# Patient Record
Sex: FEMALE | Race: WHITE | Employment: OTHER | URBAN - METROPOLITAN AREA
[De-identification: names, ages, dates, MRNs, and addresses within clinical notes are randomized per-mention and may not be internally consistent; named-entity substitution may affect disease eponyms.]

---

## 2022-04-07 ENCOUNTER — HOSPITAL ENCOUNTER (OUTPATIENT)
Dept: PHYSICAL THERAPY | Age: 77
Discharge: HOME | End: 2022-04-07
Payer: MEDICARE

## 2022-04-07 PROCEDURE — 97110 THERAPEUTIC EXERCISES: CPT

## 2022-04-07 PROCEDURE — 97161 PT EVAL LOW COMPLEX 20 MIN: CPT

## 2022-04-07 NOTE — PROGRESS NOTES
PHYSICAL THERAPY - DAILY TREATMENT NOTE     Patient Name: Dulce Maria Nguyen        Date: 2022  : 1945   YES Patient  Verified  Visit #:     Insurance: Payor: Rosemary Lilly / Plan: [place] MEDICARE PART A & B / Product Type: Medicare /      In time: 135 Out time: 220   Total Treatment Time: 45     Medicare/BCBS Time Tracking (below)   Total Timed Codes (min):  15 1:1 Treatment Time:  15     TREATMENT AREA =  Pain in left hip [M25.552]    SUBJECTIVE    Pain Level (on 0 to 10 scale):  1-2  / 10   Medication Changes/New allergies or changes in medical history, any new surgeries or procedures?     NO    If yes, update Summary List   Subjective Functional Status/Changes:  []  No changes reported     See eval /POC         OBJECTIVE  Modalities Rationale:     decrease pain to improve patient's ability to return to PLOF      min [] Estim, type/location:                                      []  att     []  unatt     []  w/US     []  w/ice    []  w/heat    min []  Mechanical Traction: type/lbs                   []  pro   []  sup   []  int   []  cont    []  before manual    []  after manual    min []  Ultrasound, settings/location:      min []  Iontophoresis w/ dexamethasone, location:                                               []  take home patch       []  in clinic    min []  Ice     []  Heat    location/position:     min []  Vasopneumatic Device, press/temp:     min []  Other:    [] Skin assessment post-treatment (if applicable):    []  intact    []  redness- no adverse reaction     []redness  adverse reaction:      15 min Therapeutic Exercise:  [x]  See flow sheet   Rationale:      increase ROM and increase strength to improve the patients ability to return to PLOF      min Manual Therapy: Technique:      [] S/DTM []IASTM []PROM [] Passive Stretching   []manual TPR    []Jt manipulation:Gr I [] II []  III [] IV[]  []REIL with manual OP  Treatment Area:     Rationale:      decrease pain, increase ROM, increase tissue extensibility and decrease trigger points to improve patient's ability to return to PLOF     min Neuromuscular Re-ed: [x]  See flow sheet   Rationale:      improve coordination, improve balance, increase proprioception and dec dizziness to improve the patients ability to return to PLOF        min Self Care:    Rationale:    increase ROM, increase strength and improve coordination to improve the patients ability to return to PLOF    Billed With/As:   [] TE   [] TA   [] Neuro   [] Self Care Patient Education: [x] Review HEP    [] Progressed/Changed HEP based on:   [] positioning   [] body mechanics   [] transfers   [] heat/ice application    [] other:        Other Objective/Functional Measures:    See eval/ POC     Post Treatment Pain Level (on 0 to 10) scale:   1-2  / 10     ASSESSMENT    X  See POC     PLAN    [x]  Upgrade activities as tolerated {YES) Continue plan of care   []  Discharge due to :    []  Other:      Therapist: Maria Elena Conde PT    Date: 4/7/2022 Time: 2:23 PM   No future appointments.

## 2022-04-07 NOTE — PROGRESS NOTES
[facility] PHYSICAL THERAPY AT [address] [facility] Sarah Ziegler, [address] - Phone: [phone]  Fax: [phone] / [facility]  Patient Name: Deandre Thornton : 1945   Medical   Diagnosis: Pain in left hip [M25.552] Treatment Diagnosis: L femoral head Fx; s/p hemiarthroplasty   Onset Date: 2/10/22 (injury)  22 (surgery)     [facility]   Prior Hospitalization: See medical history Provider #: [id number]   Prior Level of Function: Able to walk without pain   Comorbidities: Undergoing chemotherapy for CA, osteoporosis, arthritis, essential tremor, Hx DVT   Medications: Verified on Patient Summary List   The Plan of Care and following information is based on the information from the initial evaluation.   ================================================================  Assessment / key information: Deandre Thornton is a 68 y.o.  yo female with Dx of Pain in left hip [M25.552]. She reports fracturing her L hip when she fell while trying to step up onto a curb. She was seen later that day by her PCP; then at a local ER. She underwent surgery the next day. She remained in the hospital for several days. Upon release to home, she participated in a 6 week course of home health PT. She now presents for ongoing rehabilitation at this facility. Observation: well healing surgical incision. Essential tremor present L>R LE  Gait: ambulates into clinic using SC. She reports no AD use at home. Zainab is slow. Decreased step length. Mild out-toeing R>L  ROM measures: flex .  90, ext to neutral, ER= 28 degrees, abd= 15 degrees  MMT measures: WFL except diminished abd  Special tests: na  FOTO score= 41%.  ================================================================  Eval Complexity: History MEDIUM  Complexity : 1-2 comorbidities / personal factors will impact the outcome/ POC ;  Examination  HIGH Complexity : 4+ Standardized tests and measures addressing body structure, function, activity limitation and / or participation in recreation ; Presentation LOW Complexity : Stable, uncomplicated ;  Decision Making MEDIUM Complexity : FOTO score of 26-74; Overall Complexity LOW   Problem List: pain affecting function, decrease ROM, decrease strength, impaired gait/ balance, decrease ADL/ functional abilitiies, decrease activity tolerance and decrease flexibility/ joint mobility   Treatment Plan may include any combination of the following: Therapeutic exercise, Therapeutic activities, Neuromuscular re-education, Physical agent/modality, Gait/balance training, Manual therapy and Patient education  Patient / Family readiness to learn indicated by: asking questions, trying to perform skills and interest  Persons(s) to be included in education: patient (P) and family support person (FSP);list   Barriers to Learning/Limitations: None  Measures taken, if barriers to learning:    Patient Goal (s): Better ROM, flexibility, balance, strength, decrease pain and spasm   Patient self reported health status: good  Rehabilitation Potential: good     Short Term Goals: To be accomplished in  2  weeks:  1 Patient will report >= 25% improvement in symptoms with ADLs  2 Patient will be educated in appropriate ROM, stabilization, strengthening exercises; patient to report/ demonstrate compliance.  Long Term Goals: To be accomplished in  4-8  weeks:  1 Patient to report >= 75% improvement in symptoms with ADLs. 2 Patient will be independent with finalized HEP/ self maintenance. 3 Increase FOTO score >= 63% to indicate improved function with use of L LE.  4 Patient to ambulate ad leodan in community without AD and minimal deviation.     Frequency / Duration:   Patient to be seen  2  times per week for 4-8  weeks:  Patient / Caregiver education and instruction: self care, activity modification and exercises    Therapist Signature: Homero Acevedo PT Date: 3/6/8506   Certification Period: 4/7/22 to 7/4/22 Time: 2:24 PM   ===================================================================  I certify that the above Physical Therapy Services are being furnished while the patient is under my care. I agree with the treatment plan and certify that this therapy is necessary. Physician Signature:        Date:       Time:        Brittani Jacinto  Please sign and return to [facility] or you may fax the signed copy to [phone]. Thank you.

## 2022-04-11 ENCOUNTER — HOSPITAL ENCOUNTER (OUTPATIENT)
Dept: PHYSICAL THERAPY | Age: 77
Discharge: HOME | End: 2022-04-11
Payer: MEDICARE

## 2022-04-11 PROCEDURE — 97530 THERAPEUTIC ACTIVITIES: CPT

## 2022-04-11 PROCEDURE — 97140 MANUAL THERAPY 1/> REGIONS: CPT

## 2022-04-11 PROCEDURE — 97110 THERAPEUTIC EXERCISES: CPT

## 2022-04-11 NOTE — PROGRESS NOTES
Order printed. On your desk for signature for TC to fax.   Dano Perez RN     PHYSICAL THERAPY - DAILY TREATMENT NOTE     Patient Name: Dusitn Loge        Date: 2022  : 1945   YES Patient  Verified  Visit #:   2     Insurance: Payor: Sutton  / Plan: [place] MEDICARE PART A & B / Product Type: Medicare /      In time: 052 Out time: 855   Total Treatment Time: 60     Medicare/BCBS Time Tracking (below)   Total Timed Codes (min):  50 1:1 Treatment Time:  50     TREATMENT AREA =  Pain in left hip [M25.552]    SUBJECTIVE    Pain Level (on 0 to 10 scale):    / 10   Medication Changes/New allergies or changes in medical history, any new surgeries or procedures? NO    If yes, update Summary List   Subjective Functional Status/Changes:  []  No changes reported     Just kind of feel it.   Doing new exercises         OBJECTIVE  Modalities Rationale:     decrease inflammation and decrease pain to improve patient's ability to perform ADLs      min [] Estim, type/location:                                      []  att     []  unatt     []  w/US     []  w/ice    []  w/heat    min []  Mechanical Traction: type/lbs                   []  pro   []  sup   []  int   []  cont    []  before manual    []  after manual    min []  Ultrasound, settings/location:      min []  Iontophoresis w/ dexamethasone, location:                                               []  take home patch       []  in clinic   10 min [x]  Ice     []  Heat    location/position:     min []  Vasopneumatic Device, press/temp:              cold / med If using vaso       pre-treatment girth :       post-treatment girth :       measured at (location) :     min []  Other:    [x] Skin assessment post-treatment (if applicable):    [x]  intact    []  redness- no adverse reaction     []redness  adverse reaction:      10 min Manual Therapy: Technique:      [x] S/DTM []IASTM []PROM [] Passive Stretching   [x]manual TPR  [] SOR [] man traction  []Jt manipulation:Gr I [] II []  III [] IV[]   [] OP with REIL    []   Treatment Area: L hip region      *manual therapy interventions were performed at a separate and distinct time from   the therapeutic activities interventions. Rationale:      decrease pain, increase ROM, increase tissue extensibility and decrease trigger points to improve patient's ability to perform ADLs    30 min Therapeutic Exercise:  [x]  See flow sheet   Rationale:      increase ROM and increase strength to improve the patients ability to perform ADLs       10 min Therapeutic Activity: [x]  See flow sheet   Rationale:      increase strength, improve coordination and improve balance to improve the patients ability to perform ADLs       Billed With/As:   [x] TE   [x] TA   [] Neuro   [] Self Care Patient Education: [x] Review HEP    [] Progressed/Changed HEP based on:   [] positioning   [] body mechanics   [] transfers   [] heat/ice application    [] other:        Other Objective/Functional Measures:    Progressed there ex/ there act--adding bike for endurance, hip abd/ clam, hip flex/ abd, step up, squat    Pt with diminished balance requiring UE support for standing ex    Dec step length   Post Treatment Pain Level (on 0 to 10) scale:   1  / 10     ASSESSMENT    Assessment/Changes in Function:     Went to [email or web address] this weekend for the first time since breaking my hip     []  See Progress Note/Recertification   Patient will continue to benefit from skilled PT services to modify and progress therapeutic interventions, address functional mobility deficits, address ROM deficits, address strength deficits, analyze and address soft tissue restrictions, analyze and cue movement patterns, address imbalance/dizziness and instruct in home and community integration to attain remaining goals.       Progress toward goals / Updated goals:    Good Progress to    [x] STG    [x] LTG  2 as shown by pt report/ demo       []  See Progress Note/Recertification    PLAN    [x]  Upgrade activities as tolerated {YES) Continue plan of care   [] Discharge due to :    []  Other:      Therapist: Catina Westfall PT    Date: 4/11/2022 Time: 7:55 AM     Future Appointments   Date Time Provider Burton Escamilla   4/11/2022  8:00 AM Bebe Mebane, PT [facility]   4/15/2022 11:15 AM Bebe Mebane, PT [facility]   4/18/2022 12:00 PM Davion Alvarenga, PT [facility]   4/22/2022 12:00 PM Bebe Mebane, PT [facility]   4/25/2022  9:45 AM Ebb Altamont, PTA [facility]   4/29/2022 11:15 AM Bebe Mebane, PT [facility]   5/2/2022 11:00 AM Bebe Mebane, PT [facility]   5/6/2022 11:15 AM Ebb Altamont, PTA [facility]   5/9/2022  9:45 AM Ebb Altamont, PTA [facility]   5/13/2022 11:15 AM Bebe Mebane, PT [facility]   5/16/2022 11:15 AM Ebb Altamont, PTA [facility]   5/20/2022 11:15 AM Bebe Mebane, PT [facility]

## 2022-04-15 ENCOUNTER — HOSPITAL ENCOUNTER (OUTPATIENT)
Dept: PHYSICAL THERAPY | Age: 77
Discharge: HOME | End: 2022-04-15
Payer: MEDICARE

## 2022-04-15 PROCEDURE — 97530 THERAPEUTIC ACTIVITIES: CPT

## 2022-04-15 PROCEDURE — 97140 MANUAL THERAPY 1/> REGIONS: CPT

## 2022-04-15 PROCEDURE — 97110 THERAPEUTIC EXERCISES: CPT

## 2022-04-15 NOTE — PROGRESS NOTES
PHYSICAL THERAPY - DAILY TREATMENT NOTE     Patient Name: Giselle Hannon        Date: 4/15/2022  : 1945   YES Patient  Verified  Visit #:   3     Insurance: Payor: Jessika Lax / Plan: [place] MEDICARE PART A & B / Product Type: Medicare /      In time: 4 Out time: 4080   Total Treatment Time: 55     Medicare/BCBS Time Tracking (below)   Total Timed Codes (min):  45 1:1 Treatment Time:  45     TREATMENT AREA =  Pain in left hip [M25.552]    SUBJECTIVE    Pain Level (on 0 to 10 scale):  .5  / 10   Medication Changes/New allergies or changes in medical history, any new surgeries or procedures? NO    If yes, update Summary List   Subjective Functional Status/Changes:  []  No changes reported     Getting stronger.              OBJECTIVE  Modalities Rationale:     decrease inflammation and decrease pain to improve patient's ability to perform ADLs with less pain      min [] Estim, type/location:                                      []  att     []  unatt     []  w/US     []  w/ice    []  w/heat    min []  Mechanical Traction: type/lbs                   []  pro   []  sup   []  int   []  cont    []  before manual    []  after manual    min []  Ultrasound, settings/location:      min []  Iontophoresis w/ dexamethasone, location:                                               []  take home patch       []  in clinic   10 min [x]  Ice     []  Heat    location/position:     min []  Vasopneumatic Device, press/temp:              cold / med If using vaso       pre-treatment girth :       post-treatment girth :       measured at (location) :     min []  Other:    [x] Skin assessment post-treatment (if applicable):    [x]  intact    []  redness- no adverse reaction     []redness - adverse reaction:      10 min Manual Therapy: Technique:      [] S/DTM []IASTM []PROM [] Passive Stretching   []manual TPR  [] SOR [] man traction  []Jt manipulation:Gr I [] II []  III [] IV[]   [] OP with REIL    []   Treatment Area: *manual therapy interventions were performed at a separate and distinct time from   the therapeutic activities interventions. Rationale:      decrease pain, increase ROM, increase tissue extensibility and decrease trigger points to improve patient's ability to perform ADLs with less pain    20 min Therapeutic Exercise:  [x]  See flow sheet   Rationale:      increase ROM and increase strength to improve the patients ability to perform ADLs with improved ease       15 min Therapeutic Activity: [x]  See flow sheet   Rationale:      increase strength, improve coordination and improve balance to improve the patients ability to perform ADLs/ amb without deviation       Billed With/As:   [] TE   [] TA   [] Neuro   [] Self Care Patient Education: [x] Review HEP    [] Progressed/Changed HEP based on:   [] positioning   [] body mechanics   [] transfers   [] heat/ice application    [] other:        Other Objective/Functional Measures:    Inc reps as noted on flow sheet    Trial use of SC to improve gait--min difference noted   Post Treatment Pain Level (on 0 to 10) scale:   0  / 10     ASSESSMENT    Assessment/Changes in Function:   Starting to go step after step on stair,  Able to stand from chair without using hands     []  See Progress Note/Recertification   Patient will continue to benefit from skilled PT services to modify and progress therapeutic interventions, address functional mobility deficits, address ROM deficits, address strength deficits, analyze and address soft tissue restrictions, analyze and cue movement patterns, address imbalance/dizziness and instruct in home and community integration to attain remaining goals.       Progress toward goals / Updated goals:    Good Progress to    All LTGs--per pt demo       []  See Progress Note/Recertification    PLAN    [x]  Upgrade activities as tolerated {YES) Continue plan of care   []  Discharge due to :    []  Other:      Therapist: Ravi Hartley, PT Date: 4/15/2022 Time: 11:11 AM     Future Appointments   Date Time Provider Burton Escamilla   4/15/2022 11:15 AM Moshe Owens, PT [facility]   4/18/2022 12:00 PM Sang Tompkins PT [facility]   4/22/2022 12:00 PM Moshe Owens, PT [facility]   4/25/2022  9:45 AM Toño Downing, PTA [facility]   4/29/2022 11:15 AM Moshe Owens, PT [facility]   5/2/2022 11:00 AM Moshe Owens, PT [facility]   5/6/2022 11:15 AM Toño Downing PTA [facility]   5/9/2022  1:15 PM Toño Downing PTA [facility]   5/13/2022 11:15 AM Moshe Owens, PT [facility]   5/16/2022 11:15 AM Toño Downing PTA [facility]   5/20/2022 11:15 AM Moshe Owens, PT [facility]

## 2022-04-18 ENCOUNTER — HOSPITAL ENCOUNTER (OUTPATIENT)
Dept: PHYSICAL THERAPY | Age: 77
Discharge: HOME | End: 2022-04-18
Payer: MEDICARE

## 2022-04-18 PROCEDURE — 97140 MANUAL THERAPY 1/> REGIONS: CPT

## 2022-04-18 PROCEDURE — 97110 THERAPEUTIC EXERCISES: CPT

## 2022-04-18 PROCEDURE — 97530 THERAPEUTIC ACTIVITIES: CPT

## 2022-04-18 NOTE — PROGRESS NOTES
PHYSICAL THERAPY - DAILY TREATMENT NOTE     Patient Name: Shaila Evans        Date: 2022  : 1945   YES Patient  Verified  Visit #:      12  Insurance: Payor: Clovia Combe / Plan: [place] MEDICARE PART A & B / Product Type: Medicare /      In time: 8286 Out time: 1250   Total Treatment Time: 55     Medicare/BCBS Time Tracking (below)   Total Timed Codes (min):  45 1:1 Treatment Time:  45     TREATMENT AREA =  Pain in left hip [M25.552]    SUBJECTIVE    Pain Level (on 0 to 10 scale): . / 10   Medication Changes/New allergies or changes in medical history, any new surgeries or procedures? NO    If yes, update Summary List   Subjective Functional Status/Changes:  []  No changes reported     Pt reports still having difficulty navigating with stairs. Pt reports she is having less pain with putting her shoes on.        OBJECTIVE  Modalities Rationale:     decrease pain to improve patient's ability to perform ADLs with less pain    min [] Estim, type/location:                                      []  att     []  unatt     []  w/US     []  w/ice    []  w/heat    min []  Mechanical Traction: type/lbs                   []  pro   []  sup   []  int   []  cont    []  before manual    []  after manual    min []  Ultrasound, settings/location:      min []  Iontophoresis w/ dexamethasone, location:                                               []  take home patch       []  in clinic   10 min [x]  Ice     []  Heat    location/position: Supine to L hip    min []  Vasopneumatic Device, press/temp:     min []  Other:    [x] Skin assessment post-treatment (if applicable):    [x]  intact    []  redness- no adverse reaction     []redness - adverse reaction:      20 min Therapeutic Exercise:  [x]  See flow sheet   Rationale:      increase ROM and increase strength to improve the patients ability to perform ADLs with less pain     15 min Therapeutic Activity: [x]  See flow sheet--emphasis on stairs and sit<>stand Rationale:      improve coordination, improve balance, and increase proprioception to improve the patients ability to perform stairs with reciprocal gait pattern    10 min Manual Therapy: Technique:      [x] S/DTM []IASTM []PROM [] Passive Stretching   []manual TPR    []Jt manipulation:Gr I [] II []  III [] IV[] V[]  Treatment Area:  L anterior and posterior hip STM   Rationale:      decrease pain and increase postural awareness to improve patient's ability to perform amb with less pain  The manual therapy interventions were performed at a separate and distinct time from the therapeutic activities interventions. Billed With/As:   [x] TE   [x] TA   [] Neuro   [] Self Care Patient Education: [x] Review HEP    [] Progressed/Changed HEP based on:   [] positioning   [] body mechanics   [] transfers   [] heat/ice application    [] other:        Other Objective/Functional Measures:    Pt with ed on stairs, sit<>stand with improved function. Pt with supine there ex followed by therapeutic activity     Post Treatment Pain Level (on 0 to 10) scale:  1/2  / 10     ASSESSMENT    Assessment/Changes in Function:     Pt able to perform 3--6 inch steps with reciprocal gait pattern. Amb with reduced step and stride length, imbalance with turning, would benefit from static and dynamic balance activity for HEP and clinic activity for safety. []  See Progress Note/Recertification   Patient will continue to benefit from skilled PT services to modify and progress therapeutic interventions, address functional mobility deficits, address ROM deficits, and address strength deficits to attain remaining goals. Progress toward goals / Updated goals:     Good Progress to    [] STG    [x] LTG  2 as shown by fair progress toward STG 2   1. Patient will report >= 25% improvement in symptoms with ADLs  2. Patient will be educated in appropriate ROM, stabilization, strengthening exercises; patient to report/ demonstrate compliance. PLAN    [x]  Upgrade activities as tolerated YES Continue plan of care   []  Discharge due to :    []  Other:      Therapist: Deyvi Huggins PT    Date: 4/18/2022 Time: 12:17 PM     Future Appointments   Date Time Provider Burton Escamilla   4/22/2022  8:45 AM Rupinder Roque, PT [facility]   4/25/2022  9:45 AM Newdale Isabela, PTA [facility]   4/29/2022 11:15 AM Rupinder Roque, PT [facility]   5/2/2022 11:00 AM Rupinder Roque, PT [facility]   5/6/2022 11:15 AM Newdale Isabela, PTA [facility]   5/9/2022  1:15 PM Newdale Isabela, PTA [facility]   5/13/2022 11:15 AM Rupinder Roque, PT [facility]   5/16/2022 11:15 AM Newdale Isabela, PTA [facility]   5/20/2022 11:15 AM Rupinder Roque, PT [facility]

## 2022-04-22 ENCOUNTER — HOSPITAL ENCOUNTER (OUTPATIENT)
Dept: PHYSICAL THERAPY | Age: 77
Discharge: HOME | End: 2022-04-22
Payer: MEDICARE

## 2022-04-22 PROCEDURE — 97110 THERAPEUTIC EXERCISES: CPT

## 2022-04-22 PROCEDURE — 97530 THERAPEUTIC ACTIVITIES: CPT

## 2022-04-22 PROCEDURE — 97140 MANUAL THERAPY 1/> REGIONS: CPT

## 2022-04-22 NOTE — PROGRESS NOTES
PHYSICAL THERAPY - DAILY TREATMENT NOTE     Patient Name: Leandro Brandon        Date: 2022  : 1945   YES Patient  Verified  Visit #:      of   12  Insurance: Payor: Christie Colfax / Plan: [place] & [place] / Product Type: Medicare /      In time: 840 Out time: 883   Total Treatment Time: 55     Medicare/BCBS Time Tracking (below)   Total Timed Codes (min):  45 1:1 Treatment Time:  45     TREATMENT AREA =  Pain in left hip [M25.552]    SUBJECTIVE    Pain Level (on 0 to 10 scale): .5-1  / 10   Medication Changes/New allergies or changes in medical history, any new surgeries or procedures? NO    If yes, update Summary List   Subjective Functional Status/Changes:  []  No changes reported     Just a little discomfort.   Better when I move it         OBJECTIVE  Modalities Rationale:     decrease inflammation and decrease pain to improve patient's ability to perform ADLs/ amb without difficulty      min [] Estim, type/location:                                      []  att     []  unatt     []  w/US     []  w/ice    []  w/heat    min []  Mechanical Traction: type/lbs                   []  pro   []  sup   []  int   []  cont    []  before manual    []  after manual    min []  Ultrasound, settings/location:      min []  Iontophoresis w/ dexamethasone, location:                                               []  take home patch       []  in clinic   10 min [x]  Ice     []  Heat    location/position:     min []  Vasopneumatic Device, press/temp:              cold / med If using vaso       pre-treatment girth :       post-treatment girth :       measured at (location) :     min []  Other:    [] Skin assessment post-treatment (if applicable):    []  intact    []  redness- no adverse reaction     []redness - adverse reaction:      10 min Manual Therapy: Technique:      [x] S/DTM []IASTM []PROM [] Passive Stretching   [x]manual TPR  [] SOR [] man traction  []Jt manipulation:Gr I [] II []  III [] IV[]   [] OP with ROHAN    []   Treatment Area:  L glut/ upper leg      *manual therapy interventions were performed at a separate and distinct time from   the therapeutic activities interventions. Rationale:      decrease pain, increase ROM, increase tissue extensibility and decrease trigger points to improve patient's ability to perform ADLs/ amb without difficulty    20 min Therapeutic Exercise:  [x]  See flow sheet   Rationale:      increase ROM and increase strength to improve the patients ability to perform ADLs/ amb without difficulty       15 min Therapeutic Activity: [x]  See flow sheet   Rationale:      improve coordination, improve balance and increase proprioception to improve the patients ability to amb/ negotiate stairs safely/ without difficulty       Billed With/As:   [] TE   [] TA   [] Neuro   [] Self Care Patient Education: [x] Review HEP    [] Progressed/Changed HEP based on:   [] positioning   [] body mechanics   [] transfers   [] heat/ice application    [] other:        Other Objective/Functional Measures: In step height to 6 inch    Initiated static balance. Patient challenged   Post Treatment Pain Level (on 0 to 10) scale:   0  / 10     ASSESSMENT    Assessment/Changes in Function:     Conscience of increasing step length     []  See Progress Note/Recertification   Patient will continue to benefit from skilled PT services to modify and progress therapeutic interventions, address functional mobility deficits, address ROM deficits, address strength deficits, analyze and address soft tissue restrictions, analyze and cue movement patterns, address imbalance/dizziness and instruct in home and community integration to attain remaining goals.       Progress toward goals / Updated goals:    Good Progress to    [] STG    [] LTG  1 , 2as shown by pt report       []  See Progress Note/Recertification    PLAN    [x]  Upgrade activities as tolerated {YES) Continue plan of care   []  Discharge due to :    []  Other: Therapist: Flex Clinton PT    Date: 4/22/2022 Time: 8:38 AM     Future Appointments   Date Time Provider Burton Francine   4/22/2022  8:45 AM Jenna Sloan, PT [facility]   4/25/2022  9:45 AM Augustus Price PTA [facility]   4/29/2022 11:15 AM Jenna Sloan, PT [facility]   5/2/2022 11:00 AM Jenna Sloan, PT [facility]   5/6/2022 11:15 AM Augustus Price PTA [facility]   5/9/2022  1:15 PM Augustus Price PTA [facility]   5/13/2022 11:15 AM Jenna Sloan, PT [facility]   5/16/2022 11:15 AM Augustus Price PTA [facility]   5/20/2022 11:15 AM Jenna Sloan, PT [facility]

## 2022-04-25 ENCOUNTER — HOSPITAL ENCOUNTER (OUTPATIENT)
Dept: PHYSICAL THERAPY | Age: 77
Discharge: HOME | End: 2022-04-25
Payer: MEDICARE

## 2022-04-25 PROCEDURE — 97140 MANUAL THERAPY 1/> REGIONS: CPT

## 2022-04-25 PROCEDURE — 97110 THERAPEUTIC EXERCISES: CPT

## 2022-04-25 PROCEDURE — 97112 NEUROMUSCULAR REEDUCATION: CPT

## 2022-04-25 NOTE — PROGRESS NOTES
PHYSICAL THERAPY - DAILY TREATMENT NOTE     Patient Name: Luis A Ford        Date: 2022  : 1945   YES Patient  Verified  Visit #:     Insurance: Payor: Delmer Gamboalibertad / Plan: [place] MEDICARE PART A & B / Product Type: Medicare /      In time: 9:51 am Out time: 9:52    Total Treatment Time: 61     Medicare/BCBS Time Tracking (below)   Total Timed Codes (min): 51 1:1 Treatment Time:  51     TREATMENT AREA =  Pain in left hip [M25.552]    SUBJECTIVE    Pain Level (on 0 to 10 scale): . 5-1  / 10   Medication Changes/New allergies or changes in medical history, any new surgeries or procedures?     NO    If yes, update Summary List   Subjective Functional Status/Changes:  []  No changes reported       Functional improvements: performing HEP daily  Functional impairments: slow wanda in ambulation without AD         OBJECTIVE  Modalities Rationale:     decrease inflammation, decrease pain and increase tissue extensibility to improve patient's ability to peform ADLs/ armb without difficulty     min [] Estim, type/location:                                      []  att     []  unatt     []  w/US     []  w/ice    []  w/heat    min []  Mechanical Traction: type/lbs                   []  pro   []  sup   []  int   []  cont    []  before manual    []  after manual    min []  Ultrasound, settings/location:      min []  Iontophoresis w/ dexamethasone, location:                                               []  take home patch       []  in clinic   10 min [x]  Ice     []  Heat    location/position: Supine LE elevated Left hip    min []  Vasopneumatic Device, press/temp:    If using vaso (only need to measure limb vaso being performed on)      pre-treatment girth :       post-treatment girth :       measured at (landmark location) :      min []  Other:    [x] Skin assessment post-treatment (if applicable):    [x]  intact    []  redness- no adverse reaction                  []redness - adverse reaction:      21 min Therapeutic Exercise:  [x]  See flow sheet   Rationale:      increase ROM and increase strength to improve the patients ability to peform ADLs/ armb without difficulty          10 min Neuromuscular Re-ed: [x]  See flow sheet   Rationale:      increase ROM, increase strength, improve coordination, improve balance and increase proprioception to improve the patients ability to  peform ADLs/ armb without difficulty     10 min Manual Therapy: Technique:      [x] S/DTM []IASTM []PROM [] Passive Stretching   []manual TPR    []Jt manipulation:Gr I [] II []  III [] IV[] V[]  Treatment Area:   L anterior and posterior hip STM   Rationale:      decrease pain, increase ROM, increase tissue extensibility and decrease trigger points to improve patient's ability to improve tissue mobility in ADLs  The manual therapy interventions were performed at a separate and distinct time from the therapeutic activities interventions. Billed With/As:   [] TE   [] TA   [] Neuro   [] Self Care Patient Education: [x] Review HEP    [] Progressed/Changed HEP based on:   [] positioning   [] body mechanics   [] transfers   [] heat/ice application    [] other:        Other Objective/Functional Measures:  Pt navigating stairs with reciprocal gait and TAURUS rails     Post Treatment Pain Level (on 0 to 10) scale:   1 / 10     ASSESSMENT    Assessment/Changes in Function:   VCs for equal step length, slight increase in wanda as tolerated for safety in ambulation. Instruction in self scar massage      []  See Progress Note/Recertification   Patient will continue to benefit from skilled PT services to modify and progress therapeutic interventions, address functional mobility deficits, address ROM deficits, address strength deficits, analyze and address soft tissue restrictions, analyze and cue movement patterns, analyze and modify body mechanics/ergonomics and address imbalance/dizziness to attain remaining goals.       Progress toward goals / Updated goals:    Good Progress to    [x] STG    [] LTG  1 as shown by pt reporting good compliance in HEP       PLAN    [x]  Upgrade activities as tolerated YES Continue plan of care   []  Discharge due to :    []  Other:      Therapist: Nilson Hinojosa PTA    Date: 4/25/2022 Time: 10:52  AM     Future Appointments   Date Time Provider Burton Escamilla   4/29/2022 11:15 AM Vero Funes, PT [facility]   5/2/2022 11:00 AM Vero Funes, PT [facility]   5/6/2022 11:15 AM Piero Bird PTA [facility]   5/9/2022  1:15 PM Piero Bird PTA [facility]   5/13/2022 11:15 AM Vero Funes, SIMBA [facility]   5/16/2022 11:15 AM Piero Bird PTA [facility]   5/20/2022 11:15 AM Vero Funes, PT [facility]

## 2022-04-29 ENCOUNTER — HOSPITAL ENCOUNTER (OUTPATIENT)
Dept: PHYSICAL THERAPY | Age: 77
Discharge: HOME | End: 2022-04-29
Payer: MEDICARE

## 2022-04-29 PROCEDURE — 97112 NEUROMUSCULAR REEDUCATION: CPT

## 2022-04-29 PROCEDURE — 97140 MANUAL THERAPY 1/> REGIONS: CPT

## 2022-04-29 PROCEDURE — 97110 THERAPEUTIC EXERCISES: CPT

## 2022-04-29 NOTE — PROGRESS NOTES
PHYSICAL THERAPY - DAILY TREATMENT NOTE     Patient Name: Washington Nelson        Date: 2022  : 1945   YES Patient  Verified  Visit #:     Insurance: Payor: Lyndon Tomas / Plan: [place] MEDICARE PART A & B / Product Type: Medicare /      In time: 11:20 am Out time: 12:14 pm   Total Treatment Time: 54     Medicare/BCBS Time Tracking (below)   Total Timed Codes (min): 44 1:1 Treatment Time:44     TREATMENT AREA =  Pain in left hip [M25.552]    SUBJECTIVE    Pain Level (on 0 to 10 scale): .5 to 1   / 10   Medication Changes/New allergies or changes in medical history, any new surgeries or procedures?     NO    If yes, update Summary List   Subjective Functional Status/Changes:  []  No changes reported       Functional improvements: performing HEP daily  Functional impairments: mild pain going up and down the stairs       OBJECTIVE  Modalities Rationale:     decrease edema, decrease inflammation, decrease pain and increase tissue extensibility to improve patient's ability to improve patient's ability to peform ADLs/ armb without difficulty      min [] Estim, type/location:                                      []  att     []  unatt     []  w/US     []  w/ice    []  w/heat    min []  Mechanical Traction: type/lbs                   []  pro   []  sup   []  int   []  cont    []  before manual    []  after manual    min []  Ultrasound, settings/location:      min []  Iontophoresis w/ dexamethasone, location:                                               []  take home patch       []  in clinic   10 min [x]  Ice     []  Heat    location/position: Supine left hip    min []  Vasopneumatic Device, press/temp:    If using vaso (only need to measure limb vaso being performed on)      pre-treatment girth :       post-treatment girth :       measured at (landmark location) :      min []  Other:    [x] Skin assessment post-treatment (if applicable):    [x]  intact    []  redness- no adverse reaction []redness - adverse reaction:      27 min Therapeutic Exercise:  [x]  See flow sheet   Rationale:      increase ROM and increase strength to improve the patients ability to improve patient's ability to peform ADLs/ armb without difficulty        9 min Neuromuscular Re-ed: [x]  See flow sheet   Rationale:      increase ROM, increase strength, improve coordination, improve balance and increase proprioception to improve the patients ability to improve patient's ability to peform ADLs/ armb without difficulty       8 min Manual Therapy: Technique:      R side lying STM to left glut medius, ITB, piriformis     Rationale:      decrease pain, increase ROM, increase tissue extensibility and decrease trigger points to improve patient's ability to improve patient's ability to peform ADLs/ armb without difficulty    The manual therapy interventions were performed at a separate and distinct time from the therapeutic activities interventions. Billed With/As:   [] TE   [] TA   [] Neuro   [] Self Care Patient Education: [x] Review HEP    [] Progressed/Changed HEP based on:   [] positioning   [] body mechanics   [] transfers   [] heat/ice application    [] other:        Other Objective/Functional Measures:    Advanced MSR  To  BUN     Post Treatment Pain Level (on 0 to 10) scale:   0  / 10     ASSESSMENT    Assessment/Changes in Function:   TrPt tenderness noted in proximal ITB/glut medius/piriformis regions  Close S in all static balance exercise for safety   []  See Progress Note/Recertification   Patient will continue to benefit from skilled PT services to modify and progress therapeutic interventions, address functional mobility deficits, address ROM deficits, address strength deficits, analyze and address soft tissue restrictions, analyze and cue movement patterns, analyze and modify body mechanics/ergonomics, address imbalance/dizziness and instruct in home and community integration to attain remaining goals. Progress toward goals / Updated goals:    Good Progress to    [x] STG    [] LTG  1 as shown by improving LE strength, static balance, good compliance in HEP       PLAN    [x]  Upgrade activities as tolerated YES Continue plan of care   []  Discharge due to :    []  Other:      Therapist: Nilson Hinojosa PTA    Date: 4/29/2022 Time: 12:14 pm     Future Appointments   Date Time Provider Burton Escamilla   4/29/2022 11:15 AM Piero Bird PTA [facility]   5/2/2022 11:00 AM Gordon Hilliard PT [facility]   5/6/2022 11:15 AM Piero Bird PTA [facility]   5/9/2022  1:15 PM Piero Bird PTA [facility]   5/13/2022 11:15 AM Vero Funes PT [facility]   5/16/2022 11:15 AM Piero Bird PTA [facility]   5/20/2022 11:15 AM Vero Funes PT [facility]

## 2022-05-02 ENCOUNTER — HOSPITAL ENCOUNTER (OUTPATIENT)
Dept: PHYSICAL THERAPY | Age: 77
Discharge: HOME | End: 2022-05-02
Payer: MEDICARE

## 2022-05-02 PROCEDURE — 97110 THERAPEUTIC EXERCISES: CPT

## 2022-05-02 PROCEDURE — 97112 NEUROMUSCULAR REEDUCATION: CPT

## 2022-05-02 PROCEDURE — 97140 MANUAL THERAPY 1/> REGIONS: CPT

## 2022-05-02 NOTE — PROGRESS NOTES
PHYSICAL THERAPY - DAILY TREATMENT NOTE     Patient Name: Vlad Shields        Date: 2022  : 1945   YES Patient  Verified  Visit #:    Insurance: Payor: Lv Zamora / Plan: [place] MEDICARE PART A & B / Product Type: Medicare /      In time: 11 am Out time: 1155 pm   Total Treatment Time: 55     Medicare/BCBS Time Tracking (below)   Total Timed Codes (min): 45 1:1 Treatment Time: 45     TREATMENT AREA =  Pain in left hip [M25.552]    SUBJECTIVE    Pain Level (on 0 to 10 scale): .5 to 1   / 10   Medication Changes/New allergies or changes in medical history, any new surgeries or procedures? NO    If yes, update Summary List   Subjective Functional Status/Changes:  []  No changes reported   Functional improvements: performing HEP daily  Functional impairments: mild pain going up and down the stairs at home and in community. Felt better after last visit, hip was less sore with stairs.      OBJECTIVE  Modalities Rationale:     decrease edema, decrease inflammation, decrease pain and increase tissue extensibility to improve patient's ability to improve patient's ability to peform ADLs/ amb with less difficulty      min [] Estim, type/location:                                      []  att     []  unatt     []  w/US     []  w/ice    []  w/heat    min []  Mechanical Traction: type/lbs                   []  pro   []  sup   []  int   []  cont    []  before manual    []  after manual    min []  Ultrasound, settings/location:      min []  Iontophoresis w/ dexamethasone, location:                                               []  take home patch       []  in clinic   10 min [x]  Ice     []  Heat    location/position: Supine left hip    min []  Vasopneumatic Device, press/temp:    If using vaso (only need to measure limb vaso being performed on)      pre-treatment girth :       post-treatment girth :       measured at (landmark location) :      min []  Other:    [x] Skin assessment post-treatment (if applicable):    [x]  intact    []  redness- no adverse reaction                  []redness - adverse reaction:      20 min Therapeutic Exercise:  [x]  See flow sheet   Rationale:      increase ROM and increase strength to improve the patients ability to improve patient's ability to peform ADLs/ armb with less difficulty      15 min Neuromuscular Re-ed: [x]  See flow sheet   Rationale:      increase ROM, increase strength, improve coordination, improve balance and increase proprioception to improve the patients ability to improve patient's ability to peform ADLs/ armb without difficulty       10 min Manual Therapy: Technique:      R side lying STM to L gluteus medius, ITB, piriformis     Rationale:      decrease pain, increase ROM, increase tissue extensibility and decrease trigger points to improve patient's ability to improve patient's ability to peform ADLs/ amb with increased safety  *The manual therapy interventions were performed at a separate and distinct time from the therapeutic activities interventions. Billed With/As:   [] TE   [] TA   [x] Neuro   [] Self Care Patient Education: [x] Review HEP    [] Progressed/Changed HEP based on:   [] positioning   [] body mechanics   [] transfers   [] heat/ice application    [] other:        Other Objective/Functional Measures:  Standing there ex f/b NMR and manual tx at end of visit. Pt performed balance activity in parallel bars with emphasis on reducing UE support as able. Post Treatment Pain Level (on 0 to 10) scale:   0  / 10     ASSESSMENT    Assessment/Changes in Function:   Pt with EC and foam activity requiring min A for safety. Attempted 6 inch step up with pt unable to perform without hip pain.    []  See Progress Note/Recertification   Patient will continue to benefit from skilled PT services to modify and progress therapeutic interventions, address functional mobility deficits, address ROM deficits, address strength deficits, analyze and address soft tissue restrictions, analyze and cue movement patterns, analyze and modify body mechanics/ergonomics, address imbalance/dizziness and instruct in home and community integration to attain remaining goals. Progress toward goals / Updated goals:    Good Progress to    [x] STG    [] LTG  1 as shown by improving strength for ADLs, amb.        PLAN    [x]  Upgrade activities as tolerated YES Continue plan of care   []  Discharge due to :    []  Other:      Therapist: Rodney Valentine PT    Date: 5/2/2022 Time: 12 pm     Future Appointments   Date Time Provider Burton Escamilla   5/6/2022 11:15 AM Triny Websterr, PTA [facility]   5/9/2022  1:15 PM Triny Stanley, PTA [facility]   5/13/2022 11:15 AM Micaela Coello, PT [facility]   5/16/2022 11:15 AM Triny Stanley, PTA [facility]   5/20/2022 11:15 AM Micaela Coello, PT [facility]

## 2022-05-06 ENCOUNTER — HOSPITAL ENCOUNTER (OUTPATIENT)
Dept: PHYSICAL THERAPY | Age: 77
Discharge: HOME | End: 2022-05-06
Payer: MEDICARE

## 2022-05-06 PROCEDURE — 97110 THERAPEUTIC EXERCISES: CPT

## 2022-05-06 PROCEDURE — 97140 MANUAL THERAPY 1/> REGIONS: CPT

## 2022-05-06 PROCEDURE — 97112 NEUROMUSCULAR REEDUCATION: CPT

## 2022-05-06 NOTE — PROGRESS NOTES
PHYSICAL THERAPY - DAILY TREATMENT NOTE     Patient Name: Deandre Thornton        Date: 2022  : 1945   YES Patient  Verified  Visit #:     Insurance: Payor: Sarahi Mac / Plan: [place] MEDICARE PART A & B / Product Type: Medicare /      In time: 11:15 am Out time: 12:07 pm   Total Treatment Time: 53     Medicare/BCBS Time Tracking (below)   Total Timed Codes (min):  43 1:1 Treatment Time:  43     TREATMENT AREA =  Pain in left hip [M25.552]    SUBJECTIVE    Pain Level (on 0 to 10 scale): .5-1/10   Medication Changes/New allergies or changes in medical history, any new surgeries or procedures?     NO    If yes, update Summary List   Subjective Functional Status/Changes:  []  No changes reported       Pt reports soreness is about the same         OBJECTIVE  Modalities Rationale:     decrease edema, decrease inflammation, decrease pain and increase tissue extensibility to improve patient's ability to perform functional ADLs without pain   min [] Estim, type/location:                                      []  att     []  unatt     []  w/US     []  w/ice    []  w/heat    min []  Mechanical Traction: type/lbs                   []  pro   []  sup   []  int   []  cont    []  before manual    []  after manual    min []  Ultrasound, settings/location:      min []  Iontophoresis w/ dexamethasone, location:                                               []  take home patch       []  in clinic   10 min [x]  Ice     []  Heat    location/position: Supine Left hip    min []  Vasopneumatic Device, press/temp:    If using vaso (only need to measure limb vaso being performed on)      pre-treatment girth :       post-treatment girth :       measured at (landmark location) :      min []  Other:    [x] Skin assessment post-treatment (if applicable):    [x]  intact    []  redness- no adverse reaction                  []redness - adverse reaction:      20 min Therapeutic Exercise:  [x]  See flow sheet   Rationale:    increase ROM and increase strength to improve the patients ability to improve patient's ability to peform ADLs/ armb with less difficulty           13 min Neuromuscular Re-ed: [x]  See flow sheet   Rationale:      improve coordination, improve balance and increase proprioception to improve the patients ability to perform functional ADLs without pain    10 min Manual Therapy: Technique:       R side lying STM to L gluteus medius, ITB, piriformis      Rationale:      decrease pain, increase ROM, increase tissue extensibility and decrease trigger points to improve patient's ability to improve tissue mobility in ADLs  The manual therapy interventions were performed at a separate    Billed With/As:   [] TE   [] TA   [] Neuro   [] Self Care Patient Education: [x] Review HEP    [] Progressed/Changed HEP based on:   [] positioning   [] body mechanics   [] transfers   [] heat/ice application    [] other:        Other Objective/Functional Measures: Add side stepping in parallel bars      Post Treatment Pain Level (on 0 to 10) scale:   0  / 10     ASSESSMENT    Assessment/Changes in Function:   Pt demonstrating improving quad control in stair management with TAURUS rails and with 4 inch step ups. TrPt tenderness persists left proximal ITB/glut medius region     []  See Progress Note/Recertification   Patient will continue to benefit from skilled PT services to modify and progress therapeutic interventions, address functional mobility deficits, address ROM deficits, address strength deficits, analyze and address soft tissue restrictions, analyze and cue movement patterns, analyze and modify body mechanics/ergonomics and instruct in home and community integration to attain remaining goals.       Progress toward goals / Updated goals:    Good Progress to    [x] STG    [x] LTG  1, 2, 3 as shown by improving safety with stair navigation, improving ROM and strength for functional ADLs       PLAN    [x]  Upgrade activities as tolerated YES Continue plan of care   []  Discharge due to :    []  Other:      Therapist: Aspen Holly PTA    Date: 5/6/2022 Time: 12:07 pm     Future Appointments   Date Time Provider Burton Escamilla   5/9/2022  1:15 PM Dennie Maine, [place] [address] [facility]   5/13/2022 11:15 AM Sourav Mcdonnell PT [address]   5/16/2022 11:15 AM Dennie Maine, [place] [address] [facility]   5/20/2022 11:15 AM Sourav Mcdonnell PT [address]

## 2022-05-09 ENCOUNTER — HOSPITAL ENCOUNTER (OUTPATIENT)
Dept: PHYSICAL THERAPY | Age: 77
Discharge: HOME | End: 2022-05-09
Payer: MEDICARE

## 2022-05-09 PROCEDURE — 97110 THERAPEUTIC EXERCISES: CPT

## 2022-05-09 PROCEDURE — 97140 MANUAL THERAPY 1/> REGIONS: CPT

## 2022-05-09 PROCEDURE — 97112 NEUROMUSCULAR REEDUCATION: CPT

## 2022-05-09 NOTE — PROGRESS NOTES
PHYSICAL THERAPY - DAILY TREATMENT NOTE     Patient Name: Laron Rosales        Date: 2022  : 1945   YES Patient  Verified  Visit #:   10   of   12  Insurance: Payor: Severiano Quinton / Plan: [place] MEDICARE PART A & B / Product Type: Medicare /      In time: 1:15 Out time: 2:18 pm   Total Treatment Time: 62     Medicare/BCBS Time Tracking (below)   Total Timed Codes (min):  52 1:1 Treatment Time:  49     TREATMENT AREA =  Pain in left hip [M25.552]    SUBJECTIVE    Pain Level (on 0 to 10 scale): .5-1   10   Medication Changes/New allergies or changes in medical history, any new surgeries or procedures?     NO    If yes, update Summary List   Subjective Functional Status/Changes:  []  No changes reported       Functional improvements: no use of AD ~ 3 weeks         OBJECTIVE  Modalities Rationale:     decrease edema, decrease inflammation, decrease pain and increase tissue extensibility to improve patient's ability to perform functional ADLs without pain   min [] Estim, type/location:                                      []  att     []  unatt     []  w/US     []  w/ice    []  w/heat    min []  Mechanical Traction: type/lbs                   []  pro   []  sup   []  int   []  cont    []  before manual    []  after manual    min []  Ultrasound, settings/location:      min []  Iontophoresis w/ dexamethasone, location:                                               []  take home patch       []  in clinic   10 min [x]  Ice     []  Heat    location/position: Supine Left hip    min []  Vasopneumatic Device, press/temp:    If using vaso (only need to measure limb vaso being performed on)      pre-treatment girth :       post-treatment girth :       measured at (landmark location) :      min []  Other:    [x] Skin assessment post-treatment (if applicable):    [x]  intact    []  redness- no adverse reaction                  []redness - adverse reaction:      20 min Therapeutic Exercise:  [x]  See flow sheet   Rationale: increase ROM and increase strength to improve the patients ability to perform functional ADLs without pain    11 min Therapeutic Activity: [x]  See flow sheet   Rationale:      increase ROM, increase strength, improve coordination, improve balance and increase proprioception to improve the patients ability to perform functional ADLs without pain    12 min Neuromuscular Re-ed: [x]  See flow sheet   Rationale:      increase ROM, increase strength and improve coordination to improve the patients ability to perform functional ADLs without pain     9 min Manual Therapy: R side lying STM to L gluteus medius, ITB, piriformis    Rationale:      decrease pain, increase ROM, increase tissue extensibility, decrease edema  and decrease trigger points to improve patient's ability to perform functional ADLs without pain  The manual therapy interventions were performed at a separate and distinct time from the therapeutic activities interventions. Billed With/As:   [] TE   [] TA   [] Neuro   [] Self Care Patient Education: [x] Review HEP    [] Progressed/Changed HEP based on:   [] positioning   [] body mechanics   [] transfers   [] heat/ice application    [] other:        Other Objective/Functional Measures:    See progress note/recert   Post Treatment Pain Level (on 0 to 10) scale:  0  / 10     ASSESSMENT    Assessment/Changes in Function:     See progress note/recert     [x]  See Progress Note/Recertification   Patient will continue to benefit from skilled PT services to address ROM deficits, address strength deficits, analyze and address soft tissue restrictions, analyze and cue movement patterns, analyze and modify body mechanics/ergonomics and instruct in home and community integration to attain remaining goals.       Progress toward goals / Updated goals:  See recert       PLAN    [x]  Upgrade activities as tolerated YES Continue plan of care   []  Discharge due to :    []  Other:      Therapist: Rio Armstrong, PTA Date: 5/9/2022 Time: 2:18  PM     Future Appointments   Date Time Provider Burton Escamilla   5/13/2022 11:15 AM Markos Zazueta, PT [facility]   5/16/2022 11:15 AM Bev Kaba PTA [facility]   5/20/2022 11:15 AM Markos Zazueta PT [facility]

## 2022-05-09 NOTE — PROGRESS NOTES
[address] [facility] PHYSICAL THERAPY AT [facility] [address] [place], [address]  Phone: [phone]  Fax: [phone] OF CARE/RECERTIFICATION FOR PHYSICAL THERAPY          Patient Name: Tad Jacobs : 1945   Treatment/Medical Diagnosis: Pain in left hip [M25.552]   Onset Date: 2/10/22 (injury)  22 (surgery)    Referral Source: [place] [facility]): 2022   Prior Hospitalization: See Medical History Provider #: [id number]   Prior Level of Function: Able to walk without pain   Comorbidities: Undergoing chemotherapy for CA, osteoporosis, arthritis, essential tremor, Hx DVT   Medications: Verified on Patient Summary List   Visits from [facility]: 10 Missed Visits: 0     Goal/Measure of Progress Goal Met? 1. Patient will report >= 25% improvement in symptoms with ADL's     Status at last Eval: na Current Status: ~ 75% overall improvement yes   2. Patient will be educated in appropriate ROM, stabilization, strengthening exercises; patient to report/ demonstrate compliance. Status at last Eval: dependent Current Status: Pt performing HEP daily yes     Key Functional Changes/Progress: Patient has progressed well in Physical Therapy, consistently reporting improving ROM, decreasing pain, and increased functional ability. Patient is ambulating without AD in home and community ~ 3 weeks. Zainab is slow. Decreased step length. Currently pt's main complaint is stairs, bending, reaching  Pt's current pain range is .5 to 3/10    Functional improvements are ambulation without AD, decreasing pain, improving flexibility & strength.    Problem List: pain affecting function, decrease ROM, decrease strength, impaired gait/ balance, decrease ADL/ functional abilitiies, decrease activity tolerance and decrease flexibility/ joint mobility   Treatment Plan may include any combination of the following: Therapeutic exercise, Therapeutic activities, Neuromuscular re-education, Physical agent/modality, Gait/balance training, Manual therapy and Patient education  Patient Goal(s) has been updated and includes:  Better ROM, flexibility, balance, strength, decrease pain and spasm    Goals for this certification period include and are to be achieved in   4 weeks:  1 Patient to report >= 75% improvement in symptoms with ADLs. 2 Patient will be independent with finalized HEP/ self maintenance. 3 Increase FOTO score >= 63% to indicate improved function with use of L L  Frequency / Duration:   Patient to be seen  2  times per week for   4   weeks:    Assessments/Recommendations:  Pt would benefit from continued PT intervention in order to improve left hip strength, flexibility, Functional mobility, and address remaining impairments. If you have any questions/comments please contact us directly at [phone]. Thank you for allowing us to assist in the care of your patient. Therapist Signature: Ivy Bland PTA Date: 2/5/1884   Certification Period:  Reporting Period: 5/9/2022 to 8/9/2022 4/7/2022 to 5/9/2022 Time: 1:26 PM   NOTE TO PHYSICIAN:  PLEASE COMPLETE THE ORDERS BELOW AND FAX TO   [facility] [facility] at [address]: [phone]. If you are unable to process this request in 24 hours please contact our office: [phone].    ___ I have read the above report and request that my patient continue as recommended.   ___ I have read the above report and request that my patient continue therapy with the following changes/special instructions: ________________________________________________   ___ I have read the above report and request that my patient be discharged from therapy.      Physician Signature:        Date:       Time:    Brittani Figueroa.

## 2022-05-13 ENCOUNTER — HOSPITAL ENCOUNTER (OUTPATIENT)
Dept: PHYSICAL THERAPY | Age: 77
Discharge: HOME | End: 2022-05-13
Payer: MEDICARE

## 2022-05-13 PROCEDURE — 97140 MANUAL THERAPY 1/> REGIONS: CPT

## 2022-05-13 PROCEDURE — 97110 THERAPEUTIC EXERCISES: CPT

## 2022-05-13 PROCEDURE — 97112 NEUROMUSCULAR REEDUCATION: CPT

## 2022-05-13 NOTE — PROGRESS NOTES
PHYSICAL THERAPY - DAILY TREATMENT NOTE     Patient Name: Deandre Thornton        Date: 2022  : 1945   YES Patient  Verified  Visit #:     Insurance: Payor: Sarahi Mac / Plan: [place] MEDICARE PART A & B / Product Type: Medicare /      In time: 518 Out time: 1200   Total Treatment Time: 50     Medicare/BCBS Time Tracking (below)   Total Timed Codes (min):  50 1:1 Treatment Time:  50     TREATMENT AREA =  Pain in left hip [M25.552]    SUBJECTIVE    Pain Level (on 0 to 10 scale):  2  / 10   Medication Changes/New allergies or changes in medical history, any new surgeries or procedures? NO    If yes, update Summary List   Subjective Functional Status/Changes:  []  No changes reported     Saw PA--new orders         OBJECTIVE      10 min Manual Therapy: Technique:      [x] S/DTM []IASTM []PROM [x] Passive Stretching - ER  [x]manual TPR  [] SOR [] man traction  []Jt manipulation:Gr I [] II []  III [] IV[]   [] OP with REIL    []   Treatment Area:  L hip region      *manual therapy interventions were performed at a separate and distinct time from   the therapeutic activities interventions. Rationale:      decrease pain, increase ROM, increase tissue extensibility and decrease trigger points to improve patient's ability to amb/ transition with ease    20 min Therapeutic Exercise:  [x]? See flow sheet   Rationale:      increase ROM and increase strength to improve the patients ability to perform functional ADLs without pain     10 min Therapeutic Activity: [x]? See flow sheet   Rationale:      increase ROM, increase strength, improve coordination, improve balance and increase proprioception to improve the patients ability to perform functional ADLs without pain     10 min Neuromuscular Re-ed: [x]?   See flow sheet   Rationale:      increase ROM, increase strength and improve coordination to improve the patients ability to perform functional ADLs without pain         Billed With/As:   [x] TE   [x] TA   [] Neuro   [] Self Care Patient Education: [x] Review HEP    [] Progressed/Changed HEP based on:   [] positioning   [] body mechanics   [] transfers   [] heat/ice application    [] other:        Other Objective/Functional Measures:    Progressed dynamic gait activity--see flow sheet    Verbal cues to increase step length   Post Treatment Pain Level (on 0 to 10) scale:   2 / 10     ASSESSMENT    Assessment/Changes in Function:     Reports climbed multiple stairs at [facility]. Sore but able to do it    Requires CGA for dynamic gait      []  See Progress Note/Recertification   Patient will continue to benefit from skilled PT services to modify and progress therapeutic interventions, address functional mobility deficits, address ROM deficits, address strength deficits, analyze and address soft tissue restrictions, analyze and cue movement patterns, address imbalance/dizziness and instruct in home and community integration to attain remaining goals. Progress toward goals / Updated goals:    Steady progress.   Cont with dec step length; balance dysfunction       []  See Progress Note/Recertification    PLAN    [x]  Upgrade activities as tolerated {YES) Continue plan of care   []  Discharge due to :    []  Other:      Therapist: Juvenal West PT    Date: 5/13/2022 Time: 11:24 AM     Future Appointments   Date Time Provider Burton Escamilla   5/16/2022 11:15 AM Noman Blair PTA [facility]   5/20/2022 11:15 AM Nick Cho PT [facility]

## 2022-05-16 ENCOUNTER — HOSPITAL ENCOUNTER (OUTPATIENT)
Dept: PHYSICAL THERAPY | Age: 77
Discharge: HOME | End: 2022-05-16
Payer: MEDICARE

## 2022-05-16 PROCEDURE — 97110 THERAPEUTIC EXERCISES: CPT

## 2022-05-16 PROCEDURE — 97530 THERAPEUTIC ACTIVITIES: CPT

## 2022-05-16 NOTE — PROGRESS NOTES
PHYSICAL THERAPY - DAILY TREATMENT NOTE     Patient Name: Naseem Garay        Date: 2022  : 1945   YES Patient  Verified  Visit #:     Insurance: Payor: Ev Meraz / Plan: [place] MEDICARE PART A & B / Product Type: Medicare /      In time: 11:12 am Out time: 11:47   Total Treatment Time: 35     Medicare/BCBS Time Tracking (below)   Total Timed Codes (min): 25 1:1 Treatment Time:  25     TREATMENT AREA =  Pain in left hip [M25.552]    SUBJECTIVE    Pain Level (on 0 to 10 scale):  5  / 10   Medication Changes/New allergies or changes in medical history, any new surgeries or procedures? NO    If yes, update Summary List   Subjective Functional Status/Changes:  []  No changes reported   Pt reports I climbed the stairs @ [facility] . It was still ok Friday but on Saturday pain increasing intermittently 7/10. When I sitting I'm fine. When I'm walking. She started using the cane again . Its feeling a little  Bit better Than . She did some standing exercise this morning and they didn't hurt.           OBJECTIVE  Modalities Rationale:     decrease edema, decrease inflammation, decrease pain and increase tissue extensibility to improve patient's ability to perform ADLs without pain   min [] Estim, type/location:                                      []  att     []  unatt     []  w/US     []  w/ice    []  w/heat    min []  Mechanical Traction: type/lbs                   []  pro   []  sup   []  int   []  cont    []  before manual    []  after manual    min []  Ultrasound, settings/location:      min []  Iontophoresis w/ dexamethasone, location:                                               []  take home patch       []  in clinic   10 min [x]  Ice     []  Heat    location/position: Supine left hip LE elevated    min []  Vasopneumatic Device, press/temp:    If using vaso (only need to measure limb vaso being performed on)      pre-treatment girth :       post-treatment girth : measured at (landmark location) :      min []  Other:    [x] Skin assessment post-treatment (if applicable):    [x]  intact    []  redness- no adverse reaction                  []redness - adverse reaction:      15 min Therapeutic Exercise:  [x]  See flow sheet   Rationale:      increase ROM and increase strength to improve the patients ability to  perform ADLs without pain    8 min Therapeutic Activity: [x]  See flow sheet   Rationale:      improve coordination, improve balance and increase proprioception to improve the patients ability to  perform ADLs without pain          Billed With/As:   [] TE   [] TA   [] Neuro   [] Self Care Patient Education: [x] Review HEP    [] Progressed/Changed HEP based on:   [] positioning   [] body mechanics   [] transfers   [] heat/ice application    [] other:        Other Objective/Functional Measures:  Hold ex per flow sheet  Add static balance EO/EC   Post Treatment Pain Level (on 0 to 10) scale:  4.5 to 5 / 10     ASSESSMENT    Assessment/Changes in Function:     Decreased exercise per flow sheet secondary to increased soreness in left hip over the weekend. Pt reporting pain in left lateral hip GT region with weight bearing; mild increase in sx with turning . No pain with mini squats or sit to stand; Added static balance EO/ EC; pt began using SPC and use of it has decreased pain during ambulation. Pt reports performing HEP this morning before coming to PT with good tolerance. Pt education in use of HEP, activity modification , use of ice for pain management, self monitoring of sx.  Contact MD if sx increase or worsen     []  See Progress Note/Recertification   Patient will continue to benefit from skilled PT services to modify and progress therapeutic interventions, address functional mobility deficits, address ROM deficits, address strength deficits, analyze and address soft tissue restrictions, address imbalance/dizziness and instruct in home and community integration to attain remaining goals. Progress toward goals / Updated goals:    Fair Progress to    [] STG    [x] LTG  Resume ex as tolerated.        PLAN    [x]  Upgrade activities as tolerated YES Continue plan of care   []  Discharge due to :    []  Other:      Therapist: Kimmie Izaguirre PTA    Date: 5/16/2022 Time: 11:47 AM     Future Appointments   Date Time Provider Burton Escamilla   5/16/2022 11:15 AM Sharon Brambila PTA [facility]   5/20/2022 11:15 AM Qasim Awan, PT [facility]   5/23/2022  8:00 AM Qasim Awan, PT [facility]   5/27/2022 11:15 AM Qasim Awan PT [facility]   5/31/2022 11:15 AM Qasim Awan PT [facility]   6/3/2022 10:30 AM Qasim Awan PT [facility]   6/6/2022  8:45 AM Qasim Awan PT [facility]

## 2022-05-20 ENCOUNTER — HOSPITAL ENCOUNTER (OUTPATIENT)
Dept: PHYSICAL THERAPY | Age: 77
Discharge: HOME | End: 2022-05-20
Payer: MEDICARE

## 2022-05-20 PROCEDURE — 97110 THERAPEUTIC EXERCISES: CPT

## 2022-05-20 PROCEDURE — 97112 NEUROMUSCULAR REEDUCATION: CPT

## 2022-05-20 PROCEDURE — 97530 THERAPEUTIC ACTIVITIES: CPT

## 2022-05-20 NOTE — PROGRESS NOTES
PHYSICAL THERAPY - DAILY TREATMENT NOTE     Patient Name: Eric Bassett        Date: 2022  : 1945   YES Patient  Verified  Visit #:   15   of   18  Insurance: Payor: [employer] / Plan: [place] & [place] / Product Type: Medicare /      In time: 3562 Out time: 6350   Total Treatment Time: 40     Medicare/BCBS Time Tracking (below)   Total Timed Codes (min):  40 1:1 Treatment Time:  40     TREATMENT AREA =  Pain in left hip [M25.552]    SUBJECTIVE    Pain Level (on 0 to 10 scale):  3  / 10   Medication Changes/New allergies or changes in medical history, any new surgeries or procedures? NO    If yes, update Summary List   Subjective Functional Status/Changes:  []  No changes reported     Haven't felt well the last few days. Didn't do HEP past few days       OBJECTIVE      15 min Therapeutic Exercise:  [x]  See flow sheet   Rationale:      increase ROM and increase strength to improve the patients ability to amb / perform ADLs     15 min Neuromuscular Re-ed: [x]  See flow sheet   Rationale:      improve coordination, improve balance and increase proprioception to improve the patients ability to amb/ transition safely     10 min Therapeutic Activity: [x]  See flow sheet   Rationale:      increase ROM, increase strength and improve coordination to improve the patients ability to perform ADLs with ease         Billed With/As:   [] TE   [] TA   [] Neuro   [] Self Care Patient Education: [x] Review HEP    [] Progressed/Changed HEP based on:   [] positioning   [] body mechanics   [] transfers   [] heat/ice application    [] other:        Other Objective/Functional Measures:    Progressed balance activity--see flow sheet.   Requires CGA with dynamic balance    Verbal cues to increase step length   Post Treatment Pain Level (on 0 to 10) scale:   <3  / 10     ASSESSMENT    Assessment/Changes in Function:     Fatigue from chemo--less compliant with HEP     []  See Progress Note/Recertification   Patient will continue to benefit from skilled PT services to modify and progress therapeutic interventions, address functional mobility deficits, address ROM deficits, address strength deficits, analyze and address soft tissue restrictions, analyze and cue movement patterns, address imbalance/dizziness and instruct in home and community integration to attain remaining goals.       Progress toward goals / Updated goals:    Progressing balance activity       []  See Progress Note/Recertification    PLAN    [x]  Upgrade activities as tolerated {YES) Continue plan of care   []  Discharge due to :    []  Other:      Therapist: Terry Macdonald PT    Date: 5/20/2022 Time: 11:19 AM     Future Appointments   Date Time Provider Burton Escamilla   5/23/2022  8:00 AM Cayla Roldan, PT [facility]   5/27/2022 11:15 AM Cayla Cobdomingo, PT [facility]   5/31/2022 11:15 AM Cayla Cobdomingo, PT [facility]   6/3/2022 10:30 AM Cayla Cobdomingo, PT [facility]   6/6/2022  8:45 AM Cayla Cobdomingo, PT [facility]

## 2022-05-23 ENCOUNTER — HOSPITAL ENCOUNTER (OUTPATIENT)
Dept: PHYSICAL THERAPY | Age: 77
Discharge: HOME | End: 2022-05-23
Payer: MEDICARE

## 2022-05-23 PROCEDURE — 97112 NEUROMUSCULAR REEDUCATION: CPT

## 2022-05-23 PROCEDURE — 97530 THERAPEUTIC ACTIVITIES: CPT

## 2022-05-23 PROCEDURE — 97110 THERAPEUTIC EXERCISES: CPT

## 2022-05-23 NOTE — PROGRESS NOTES
PHYSICAL THERAPY - DAILY TREATMENT NOTE     Patient Name: Mag Allison        Date: 2022  : 1945   YES Patient  Verified  Visit #:   14      18  Insurance: Payor: Kayla Stern / Plan: [place] MEDICARE PART A & B / Product Type: Medicare /      In time: 750 Out time: 835   Total Treatment Time: 45     Medicare/BCBS Time Tracking (below)   Total Timed Codes (min):  45 1:1 Treatment Time:  45     TREATMENT AREA =  Pain in left hip [M25.552]    SUBJECTIVE    Pain Level (on 0 to 10 scale):  2   10   Medication Changes/New allergies or changes in medical history, any new surgeries or procedures?     NO    If yes, update Summary List   Subjective Functional Status/Changes:  []  No changes reported     More painful after sitting a period of time, then trying to get up         OBJECTIVE      15 min Therapeutic Exercise:  [x]  See flow sheet   Rationale:      increase ROM and increase strength to improve the patients ability to amb/ perform ADLs     15 min Neuromuscular Re-ed: [x]  See flow sheet   Rationale:      improve coordination, improve balance and increase proprioception to improve the patients ability to amb/ transition safely     15 min Therapeutic Activity: [x]  See flow sheet   Rationale:      increase ROM, increase strength and improve coordination to improve the patients ability to perform ADLs with ease         Billed With/As:   [x] TE   [x] TA   [x] Neuro   [] Self Care Patient Education: [x] Review HEP    [] Progressed/Changed HEP based on:   [] positioning   [] body mechanics   [] transfers   [] heat/ice application    [] other:        Other Objective/Functional Measures:    Modified PT program with emphasis on balance and activity,    Table ex's as HEP; added HS stretch    See flow sheet for specifics   Post Treatment Pain Level (on 0 to 10) scale:   2   10     ASSESSMENT    Assessment/Changes in Function:     Progressing balance; requires close supervision- CGA     []  See Progress Note/Recertification   Patient will continue to benefit from skilled PT services to modify and progress therapeutic interventions, address functional mobility deficits, address ROM deficits, address strength deficits, analyze and address soft tissue restrictions, analyze and cue movement patterns and address imbalance/dizziness to attain remaining goals.       Progress toward goals / Updated goals:    Cont requires verbal cues for increasing step length during amb       []  See Progress Note/Recertification    PLAN    [x]  Upgrade activities as tolerated {YES) Continue plan of care   []  Discharge due to :    []  Other:      Therapist: Lluvia Milligan PT    Date: 5/23/2022 Time: 7:51 AM     Future Appointments   Date Time Provider Burton Escamilla   5/23/2022  8:00 AM Natividad Putnam, PT [facility]   5/27/2022 11:15 AM Natividad Putnam, PT [facility]   5/31/2022 11:15 AM Natividad Putnam PT [facility]   6/3/2022 10:30 AM Natividad Putnam PT [facility]   6/6/2022  8:45 AM Natividad Putnam, PT [facility]

## 2022-05-27 ENCOUNTER — HOSPITAL ENCOUNTER (OUTPATIENT)
Dept: PHYSICAL THERAPY | Age: 77
Discharge: HOME | End: 2022-05-27
Payer: MEDICARE

## 2022-05-27 PROCEDURE — 97530 THERAPEUTIC ACTIVITIES: CPT

## 2022-05-27 PROCEDURE — 97110 THERAPEUTIC EXERCISES: CPT

## 2022-05-27 PROCEDURE — 97112 NEUROMUSCULAR REEDUCATION: CPT

## 2022-05-27 NOTE — PROGRESS NOTES
PHYSICAL THERAPY - DAILY TREATMENT NOTE     Patient Name: Dustin Loge        Date: 2022  : 1945   YES Patient  Verified  Visit #:   15   of   18  Insurance: Payor: Herman  / Plan: [place] MEDICARE PART A & B / Product Type: Medicare /      In time:  Out time: 274   Total Treatment Time: 40     Medicare/BCBS Time Tracking (below)   Total Timed Codes (min):  40 1:1 Treatment Time:  40     TREATMENT AREA =  Pain in left hip [M25.552]    SUBJECTIVE    Pain Level (on 0 to 10 scale):  3  / 10   Medication Changes/New allergies or changes in medical history, any new surgeries or procedures? NO    If yes, update Summary List   Subjective Functional Status/Changes:  []  No changes reported     Hip is slightly sore         OBJECTIVE       15 min Therapeutic Exercise:  [x]? See flow sheet   Rationale:      increase ROM and increase strength to improve the patients ability to amb/ perform ADLs      15 min Neuromuscular Re-ed: [x]? See flow sheet   Rationale:      improve coordination, improve balance and increase proprioception to improve the patients ability to amb/ transition safely      10 min Therapeutic Activity: [x]? See flow sheet   Rationale:      increase ROM, increase strength and improve coordination to improve the patients ability to perform ADLs with ease        Billed With/As:   [] TE   [] TA   [] Neuro   [] Self Care Patient Education: [x] Review HEP    [] Progressed/Changed HEP based on:   [] positioning   [] body mechanics   [] transfers   [] heat/ice application    [] other:        Other Objective/Functional Measures:    Pt with shuffling gait--requires cueing to inc step length.   Added step over 2x4 to inc step length   Post Treatment Pain Level (on 0 to 10) scale:   3  / 10     ASSESSMENT    Assessment/Changes in Function:     Pt with slowed progress regarding balance and gait     []  See Progress Note/Recertification   Patient will continue to benefit from skilled PT services to modify and progress therapeutic interventions, address functional mobility deficits, address ROM deficits, address strength deficits, address imbalance/dizziness and instruct in home and community integration to attain remaining goals.       Progress toward goals / Updated goals:    Reaching plateau in progress     []  See Progress Note/Recertification    PLAN    [x]  Upgrade activities as tolerated {YES) Continue plan of care   []  Discharge due to :    []  Other:      Therapist: Chano Willams PT    Date: 5/27/2022 Time: 11:16 AM     Future Appointments   Date Time Provider Burton Escamilla   5/31/2022 11:15 AM Markos Spoon, PT [facility]   6/3/2022 10:30 AM Markos Spoon, PT [facility]   6/6/2022  8:45 AM Markos Spoon, PT [facility]

## 2022-05-31 ENCOUNTER — HOSPITAL ENCOUNTER (OUTPATIENT)
Dept: PHYSICAL THERAPY | Age: 77
Discharge: HOME | End: 2022-05-31
Payer: MEDICARE

## 2022-05-31 PROCEDURE — 97535 SELF CARE MNGMENT TRAINING: CPT

## 2022-05-31 PROCEDURE — 97530 THERAPEUTIC ACTIVITIES: CPT

## 2022-05-31 PROCEDURE — 97110 THERAPEUTIC EXERCISES: CPT

## 2022-05-31 PROCEDURE — 97112 NEUROMUSCULAR REEDUCATION: CPT

## 2022-05-31 NOTE — PROGRESS NOTES
PHYSICAL THERAPY - DAILY TREATMENT NOTE     Patient Name: Rumaldo Landau        Date: 2022  : 1945   YES Patient  Verified  Visit #:     Insurance: Payor: Sheryl Gomez / Plan: [place] MEDICARE PART A & B / Product Type: Medicare /      In time: 9593 Out time: 9644   Total Treatment Time: 55     Medicare/BCBS Time Tracking (below)   Total Timed Codes (min):  55 1:1 Treatment Time:  55     TREATMENT AREA =  Pain in left hip [M25.552]    SUBJECTIVE    Pain Level (on 0 to 10 scale):  3  / 10   Medication Changes/New allergies or changes in medical history, any new surgeries or procedures? NO    If yes, update Summary List   Subjective Functional Status/Changes:  []  No changes reported     I still just feel it, but getting better         OBJECTIVE         15 min Therapeutic Exercise:  [x]? ?  See flow sheet   Rationale:      increase ROM and increase strength to improve the patients ability to amb/ perform ADLs      15 min Neuromuscular Re-ed: [x]? ?  See flow sheet   Rationale:      improve coordination, improve balance and increase proprioception to improve the patients ability to amb/ transition safely      10 min Therapeutic Activity: [x]? ?  See flow sheet   Rationale:      increase ROM, increase strength and improve coordination to improve the patients ability to perform ADLs with ease      15 min Self help: [x]? ?  See flow sheet--updated HEP.   Provided written instruction   Rationale:      increase ROM, increase strength and improve coordination to improve the patients ability to perform ADLs with ease      Billed With/As:   [] TE   [] TA   [] Neuro   [] Self Care Patient Education: [x] Review HEP    [] Progressed/Changed HEP based on:   [] positioning   [] body mechanics   [] transfers   [] heat/ice application    [] other:        Other Objective/Functional Measures:    Progressed HEP and provided pt with written instruction   Post Treatment Pain Level (on 0 to 10) scale:   3   10 ASSESSMENT    Assessment/Changes in Function:     Reaching plateau     []  See Progress Note/Recertification   Patient will continue to benefit from skilled PT services to modify and progress therapeutic interventions, address functional mobility deficits, address ROM deficits, address strength deficits and analyze and cue movement patterns to attain remaining goals. Progress toward goals / Updated goals:    Reaching plateau in progress    Updated written HEP. Pt reports compliance.   LTG 2 MET       []  See Progress Note/Recertification    PLAN    [x]  Upgrade activities as tolerated {YES) Continue plan of care   []  Discharge due to :    []  Other:      Therapist: Darla Dancer, PT    Date: 5/31/2022 Time: 11:16 AM     Future Appointments   Date Time Provider Burton Escamilla   6/3/2022 10:30 AM Gerardo Machuca, PT [facility]   6/6/2022  8:45 AM Gerardo Machuca PT [facility]

## 2022-06-03 ENCOUNTER — HOSPITAL ENCOUNTER (OUTPATIENT)
Dept: PHYSICAL THERAPY | Age: 77
Discharge: HOME | End: 2022-06-03
Payer: MEDICARE

## 2022-06-03 PROCEDURE — 97530 THERAPEUTIC ACTIVITIES: CPT

## 2022-06-03 PROCEDURE — 97112 NEUROMUSCULAR REEDUCATION: CPT

## 2022-06-03 PROCEDURE — 97110 THERAPEUTIC EXERCISES: CPT

## 2022-06-06 ENCOUNTER — HOSPITAL ENCOUNTER (OUTPATIENT)
Dept: PHYSICAL THERAPY | Age: 77
Discharge: HOME | End: 2022-06-06
Payer: MEDICARE

## 2022-06-06 PROCEDURE — 97530 THERAPEUTIC ACTIVITIES: CPT

## 2022-06-06 PROCEDURE — 97110 THERAPEUTIC EXERCISES: CPT

## 2022-06-06 PROCEDURE — 97112 NEUROMUSCULAR REEDUCATION: CPT

## 2022-06-06 PROCEDURE — 97535 SELF CARE MNGMENT TRAINING: CPT

## 2022-06-06 NOTE — PROGRESS NOTES
PHYSICAL THERAPY - DAILY TREATMENT NOTE     Patient Name: Jana Cutler        Date: 2022  : 1945   YES Patient  Verified  Visit #:   18   of   18  Insurance: Payor: Shannan Henry / Plan: [place] MEDICARE PART A & B / Product Type: Medicare /      In time: 804 Out time: 945   Total Treatment Time: 60     Medicare/BCBS Time Tracking (below)   Total Timed Codes (min):  60 1:1 Treatment Time:  60     TREATMENT AREA =  Pain in left hip [M25.552]    SUBJECTIVE    Pain Level (on 0 to 10 scale):  2  / 10   Medication Changes/New allergies or changes in medical history, any new surgeries or procedures? NO    If yes, update Summary List   Subjective Functional Status/Changes:  []  No changes reported     I know it's there    Overall 75% better. Going up stairs is an issue. It pulls          OBJECTIVE    15 min Therapeutic Exercise:  [x]? ?  See flow sheet   Rationale:      increase ROM and increase strength to improve the patients ability to amb/ perform ADLs      15 min Neuromuscular Re-ed: [x]? ?  See flow sheet   Rationale:      improve coordination, improve balance and increase proprioception to improve the patients ability to amb/ transition safely      15 min Therapeutic Activity: [x]? ?  See flow sheet   Rationale:      increase ROM, increase strength and improve coordination to improve the patients ability to perform ADLs with ease    15 min Self Care: Ed patient/  in post PT options to include purchase of airex, yoga, stability, home equippment   Rationale:    increase ROM, increase strength, improve coordination and improve balance to improve the patients ability to perform ADLs/ amb    Billed With/As:   [] TE   [] TA   [] Neuro   [] Self Care Patient Education: [x] Review HEP    [] Progressed/Changed HEP based on:   [] positioning   [] body mechanics   [] transfers   [] heat/ice application    [] other:        Other Objective/Functional Measures:    Provided seated piriformis stretch to assist with putting shoes on   Post Treatment Pain Level (on 0 to 10) scale:   2  / 10     ASSESSMENT      [x]  See Progress Note/Recertification/ DC    I have reviewed discharge instructions with the patient and spouse. The patient and spouse verbalized understanding. PLAN    []  Upgrade activities as tolerated {YES) Continue plan of care   [x]  Discharge due to :    []  Other:      Therapist: Anu Jerry PT    Date: 6/6/2022 Time: 8:53 AM   No future appointments.

## 2022-06-13 NOTE — PROGRESS NOTES
[facility] PHYSICAL THERAPY AT [facility] [address], [address]  Phone: [phone]  Fax: [phone]   PHYSICAL THERAPY DISCHARGE                  Patient Name: Dulce Maria Nguyen : 1945   Treatment/Medical Diagnosis: Pain in left hip [M25.552]   Onset Date: 2/10/22 (injury)  22 (surgery)     Referral Source: [facility]): 2022   Prior Hospitalization: See Medical History Provider #: [id number]   Prior Level of Function: Able to walk without pain   Comorbidities: Undergoing chemotherapy for CA, osteoporosis, arthritis, essential tremor, Hx DVT   Medications: Verified on Patient Summary List   Visits from [facility]: 18 Missed Visits: 0               Goal/Measure of Progress Goal Met? 1. Patient will report >= 75% improvement in symptoms with ADL's      Status at last Eval: na Current Status: ~ 75% overall improvement yes   2. Patient will be educated in appropriate ROM, stabilization, strengthening exercises; patient to report/ demonstrate compliance. Status at last Eval: dependent Current Status: Pt performing HEP daily yes     #. Increase FOTO score >= 63% to indicate improved function with use of L LE   Status at last Eval: 41% Current Status: 57% progressing       Key Functional Changes/Progress: Patient has progressed well through this course of PT, demonstrating increased ROM, strength, endurance. She reports increasing activities at home and into the community. She is independent with and compliant performing her HEP. She is transitioning into modified yoga program    Assessments/Recommendations:  discharge from PT    If you have any questions/comments please contact us directly at [phone].    Thank you for allowing us to assist in the care of your patient.     Therapist Signature: Destiny Shaw PT Date: [id number]   Certification Period:  Reporting Period: 2022 to 2022 to 22 Time: 1:56 PM

## 2023-09-12 NOTE — PROGRESS NOTES
PHYSICAL THERAPY - DAILY TREATMENT NOTE     Patient Name: Leandro Brandon        Date: 6/3/2022  : 1945   YES Patient  Verified  Visit #:   16   of   18  Insurance: Payor: Christie Carter / Plan: [place] MEDICARE PART A & B / Product Type: Medicare /      In time:  Out time: 82   Total Treatment Time: 40     Medicare/BCBS Time Tracking (below)   Total Timed Codes (min):  40 1:1 Treatment Time:  40     TREATMENT AREA =  Pain in left hip [M25.552]    SUBJECTIVE    Pain Level (on 0 to 10 scale):  2   10   Medication Changes/New allergies or changes in medical history, any new surgeries or procedures? NO    If yes, update Summary List   Subjective Functional Status/Changes:  []  No changes reported     For the first time, feel like my leg is getting back to normal         OBJECTIVE      15 min Therapeutic Exercise:  [x]  See flow sheet   Rationale:      increase ROM and increase strength to improve the patients ability to amb/ perform ADLs     15 min Neuromuscular Re-ed: [x]  See flow sheet   Rationale:      improve coordination, improve balance and increase proprioception to improve the patients ability to amb/ transition safely     10 min Therapeutic Activity: [x]  See flow sheet   Rationale:      increase ROM, increase strength and improve coordination to improve the patients ability to perform ADLs with ease         Billed With/As:   [x] TE   [x] TA   [x] Neuro   [] Self Care Patient Education: [x] Review HEP    [] Progressed/Changed HEP based on:   [] positioning   [] body mechanics   [] transfers   [] heat/ice application    [] other:        Other Objective/Functional Measures:    Cont to require cueing to inc step length, education on heel toe patterning    Inc footing with static balance on floor and with EC--pt challenged    LOB x 1 with retro amb, requiring PT   Post Treatment Pain Level (on 0 to 10) scale:   2   10     ASSESSMENT    Assessment/Changes in Function:     Reaching plateau.   Pt with hesitancy of initiation of gait and dec step lengths     []  See Progress Note/Recertification   Patient will continue to benefit from skilled PT services to modify and progress therapeutic interventions, address functional mobility deficits, address ROM deficits, address strength deficits, analyze and address soft tissue restrictions, analyze and cue movement patterns, address imbalance/dizziness and instruct in home and community integration to attain remaining goals. Progress toward goals / Updated goals:    Patient reports compliance with revised HEP. Reaching plataue.   Plan to DC at next visit     []  See Progress Note/Recertification    PLAN    [x]  Upgrade activities as tolerated {YES) Continue plan of care   []  Discharge due to :    []  Other:      Therapist: Abdiirzak Cantor PT    Date: 6/3/2022 Time: 10:27 AM     Future Appointments   Date Time Provider Burton Escamilla   6/3/2022 10:30 AM Sourav Mcdonnell PT [facility]   6/6/2022  8:45 AM Sourav Mcdonnell PT [facility] Azithromycin Pregnancy And Lactation Text: This medication is considered safe during pregnancy and is also secreted in breast milk.